# Patient Record
Sex: FEMALE | URBAN - METROPOLITAN AREA
[De-identification: names, ages, dates, MRNs, and addresses within clinical notes are randomized per-mention and may not be internally consistent; named-entity substitution may affect disease eponyms.]

---

## 2020-05-25 ENCOUNTER — NURSE TRIAGE (OUTPATIENT)
Dept: NURSING | Facility: CLINIC | Age: 50
End: 2020-05-25

## 2020-05-26 NOTE — TELEPHONE ENCOUNTER
Calling about getting Covid-19 PCR testing.  Patient is asymptomatic and not a healthcare worker.  Recommended she call her PCP in the morning or call us back if she develops symptoms.    Araceli Betts RN  [facility]    Additional Information    General information question, no triage required and triager able to answer question    Protocols used: INFORMATION ONLY CALL-A-AH